# Patient Record
Sex: MALE | Race: WHITE | Employment: FULL TIME
[De-identification: names, ages, dates, MRNs, and addresses within clinical notes are randomized per-mention and may not be internally consistent; named-entity substitution may affect disease eponyms.]

---

## 2018-09-19 ENCOUNTER — TELEPHONE (OUTPATIENT)
Dept: CASE MANAGEMENT | Age: 58
End: 2018-09-19

## 2018-09-19 VITALS — BODY MASS INDEX: 28.93 KG/M2 | WEIGHT: 180 LBS | HEIGHT: 66 IN

## 2018-09-19 NOTE — TELEPHONE ENCOUNTER
Upcoming CT Lung Screening. Encounter opened for documentation of required elements for LCSR.     NIKKI Strange., R.T.(R)(T)  Navigator  [facility]

## 2018-09-26 ENCOUNTER — HOSPITAL ENCOUNTER (OUTPATIENT)
Dept: CT IMAGING | Age: 58
Discharge: HOME | End: 2018-09-28
Payer: COMMERCIAL

## 2018-09-26 DIAGNOSIS — F17.200 CURRENT SMOKER: ICD-10-CM

## 2018-09-26 PROCEDURE — G0297 LDCT FOR LUNG CA SCREEN: HCPCS

## 2018-09-27 ENCOUNTER — TELEPHONE (OUTPATIENT)
Dept: CASE MANAGEMENT | Age: 58
End: 2018-09-27

## 2018-09-27 NOTE — TELEPHONE ENCOUNTER
No call, encounter opened to process CT Lung Screening.       CT Lung Screen 9/26/18 :  Impression   5 mm nodule in the right upper segment of the right lower lobe       Guidelines for Management of Incidental Pulmonary Nodules Detected on   CT Images:  From the Fleischner Society 2017.           Pack years: 40    History   Smoking Status    Current Every Day Smoker    Packs/day: 1.00    Years: 40.00    Types: Cigarettes   Smokeless Tobacco    Not on file     Comment: Per CT Lung Screening Physician Order               NIKKI Nixon., R.T.(R)(T)  Lung Nodule Navigator  [facility] [address] [facility]  [phone]

## 2018-10-15 ENCOUNTER — TELEPHONE (OUTPATIENT)
Dept: CASE MANAGEMENT | Age: 58
End: 2018-10-15

## 2019-03-05 ENCOUNTER — TELEPHONE (OUTPATIENT)
Dept: CASE MANAGEMENT | Age: 59
End: 2019-03-05

## 2019-03-15 ENCOUNTER — TELEPHONE (OUTPATIENT)
Dept: CASE MANAGEMENT | Age: 59
End: 2019-03-15

## 2019-04-12 ENCOUNTER — TELEPHONE (OUTPATIENT)
Dept: CASE MANAGEMENT | Age: 59
End: 2019-04-12

## 2019-04-12 NOTE — TELEPHONE ENCOUNTER
No call, encounter opened to process CT Lung Screening.       CT Lung Screen 9/26/18 :  Impression   5 mm nodule in the right upper segment of the right lower lobe       Guidelines for Management of Incidental Pulmonary Nodules Detected on   CT Images:  From the Fleischner Society 2017.             Pack years: 40               History   Smoking Status    Current Every Day Smoker    Packs/day: 1.00    Years: 40.00    Types: Cigarettes   Smokeless Tobacco    Not on file       Comment: Per CT Lung Screening Physician Order     3/5/2019 - Reminder mailed to patient.        3/15/2019 - Reminder mailed to patient and routed to ordering physician. 4/12/2019 - Follow up call to patient. Patient states he doesn't recall talking to Dr. Marco Antonio Israel about the screening. We discussed that a small  nodule was noted and follow up was suggested in 6 months. He asked that I call Dr. Keily Dalton office to make sure they got the report and agree with the follow up recommendations. 4/12/2019 - Call placed to Dr's office. Dr. Marco Antonio Israel discussed the nodule with patient on 11/5/18 and again at his appointment in March 2019. The office was in the process of sending the referral for the follow up CT Chest already.     NIKKI Santos., R.T.(R)(T)  Lung Nodule Navigator  CT Lung Cancer Screening- [facility]

## 2019-04-26 ENCOUNTER — TELEPHONE (OUTPATIENT)
Dept: CASE MANAGEMENT | Age: 59
End: 2019-04-26

## 2019-04-26 NOTE — TELEPHONE ENCOUNTER
No call, encounter opened to process CT Lung Screening.       CT Lung Screen 9/26/18 :  Impression   5 mm nodule in the right upper segment of the right lower lobe       Guidelines for Management of Incidental Pulmonary Nodules Detected on   CT Images:  From the Fleischner Society 2017.             Pack years: 40               History   Smoking Status    Current Every Day Smoker    Packs/day: 1.00    Years: 40.00    Types: Cigarettes   Smokeless Tobacco    Not on file       Comment: Per CT Lung Screening Physician Order      3/5/2019 - Reminder mailed to patient.        3/15/2019 - Reminder mailed to patient and routed to ordering physician.         4/12/2019 - Follow up call to patient. Patient states he doesn't recall talking to Dr. Ammy Solomon about the screening. We discussed that a small  nodule was noted and follow up was suggested in 6 months. He asked that I call Dr. Guerda Allen office to make sure they got the report and agree with the follow up recommendations.     4/12/2019 - Call placed to Dr's office. Dr. Ammy Solomon discussed the nodule with patient on 11/5/18 and again at his appointment in March 2019. The office was in the process of sending the referral for the follow up CT Chest already. 4/26/2019 - Reminder letter mailed to patient.     NIKKI Cortez., R.T.(R)(T)  Lung Nodule Navigator  CT Lung Cancer Screening- [facility]

## 2019-06-03 ENCOUNTER — TELEPHONE (OUTPATIENT)
Dept: CASE MANAGEMENT | Age: 59
End: 2019-06-03

## 2019-09-06 ENCOUNTER — TELEPHONE (OUTPATIENT)
Dept: CASE MANAGEMENT | Age: 59
End: 2019-09-06

## 2019-09-06 NOTE — TELEPHONE ENCOUNTER
No call, encounter opened to process CT Lung Screening.       CT Lung Screen 9/26/18 :  Impression   5 mm nodule in the right upper segment of the right lower lobe       Guidelines for Management of Incidental Pulmonary Nodules Detected on   CT Images:  From the Fleischner Society 2017.             Pack years: 40               History   Smoking Status    Current Every Day Smoker    Packs/day: 1.00    Years: 40.00    Types: Cigarettes   Smokeless Tobacco    Not on file       Comment: Per CT Lung Screening Physician Order      3/5/2019 - Reminder mailed to patient.        3/15/2019 - Reminder mailed to patient and routed to ordering physician.         4/12/2019 - Follow up call to patient. Patient states he doesn't recall talking to Dr. Woody Leroyr about the screening.  We discussed that a small  nodule was noted and follow up was suggested in 6 months. [facility] asked that I call Dr. Jozef Shoemaker office to make sure they got the report and agree with the follow up recommendations.     4/12/2019 - Call placed to Dr's office.  Dr. Mckay Courser discussed the nodule with patient on 11/5/18 and again at his appointment in March 2019. [facility] office was in the process of sending the referral for the follow up CT Chest already.        4/26/2019 - Reminder letter mailed to patient.     6/3/2019 - Follow up call to Dr. Jozef Shoemaker office, office to call us back and give update as Dr. Hemphill Hew the chart.     7/1/2019 - Office never called back with update. Will close 6 mo fu recommendation and add to annual screening reminder.        9/6/2019 No call, encounter opened for documentation in the lung nodule navigator flow sheet/ Lung screening patient. Annual CT lung screening reminder mailed to patient. Reminders will be sent every 30 days until this patient schedules their screening exam or exceeds 60 days past due. Once a screening is over 60 days past due, the patient will be removed from active program surveillance and future follow up reminders.       CT

## 2019-11-25 ENCOUNTER — TELEPHONE (OUTPATIENT)
Dept: CASE MANAGEMENT | Age: 59
End: 2019-11-25

## 2019-12-27 ENCOUNTER — HOSPITAL ENCOUNTER (OUTPATIENT)
Dept: CT IMAGING | Age: 59
Discharge: HOME | End: 2019-12-29
Payer: COMMERCIAL

## 2019-12-27 DIAGNOSIS — R91.1 COIN LESION: ICD-10-CM

## 2019-12-27 PROCEDURE — 71250 CT THORAX DX C-: CPT

## 2021-10-23 ENCOUNTER — HOSPITAL ENCOUNTER (EMERGENCY)
Age: 61
Discharge: HOME | End: 2021-10-24
Attending: STUDENT IN AN ORGANIZED HEALTH CARE EDUCATION/TRAINING PROGRAM
Payer: COMMERCIAL

## 2021-10-23 ENCOUNTER — APPOINTMENT (OUTPATIENT)
Dept: CT IMAGING | Age: 61
End: 2021-10-23
Payer: COMMERCIAL

## 2021-10-23 ENCOUNTER — APPOINTMENT (OUTPATIENT)
Dept: GENERAL RADIOLOGY | Age: 61
End: 2021-10-23
Payer: COMMERCIAL

## 2021-10-23 DIAGNOSIS — M79.602 LEFT ARM PAIN: Primary | ICD-10-CM

## 2021-10-23 LAB
ALBUMIN SERPL-MCNC: 4.2 G/DL (ref 3.5–5.2)
ALP BLD-CCNC: 93 U/L (ref 40–129)
ALT SERPL-CCNC: 44 U/L (ref 0–40)
ANION GAP SERPL CALCULATED.3IONS-SCNC: 14 MMOL/L (ref 7–16)
AST SERPL-CCNC: 37 U/L (ref 0–39)
BASOPHILS ABSOLUTE: 0.07 E9/L (ref 0–0.2)
BASOPHILS RELATIVE PERCENT: 1 % (ref 0–2)
BILIRUB SERPL-MCNC: 0.4 MG/DL (ref 0–1.2)
BUN BLDV-MCNC: 14 MG/DL (ref 6–23)
CALCIUM SERPL-MCNC: 9.1 MG/DL (ref 8.6–10.2)
CHLORIDE BLD-SCNC: 90 MMOL/L (ref 98–107)
CO2: 27 MMOL/L (ref 22–29)
CREAT SERPL-MCNC: 1 MG/DL (ref 0.7–1.2)
D DIMER: 237 NG/ML DDU
EOSINOPHILS ABSOLUTE: 1.14 E9/L (ref 0.05–0.5)
EOSINOPHILS RELATIVE PERCENT: 16.2 % (ref 0–6)
GFR AFRICAN AMERICAN: >60
GFR NON-AFRICAN AMERICAN: >60 ML/MIN/1.73
GLUCOSE BLD-MCNC: 120 MG/DL (ref 74–99)
HCT VFR BLD CALC: 41.3 % (ref 37–54)
HEMOGLOBIN: 15 G/DL (ref 12.5–16.5)
IMMATURE GRANULOCYTES #: 0.03 E9/L
IMMATURE GRANULOCYTES %: 0.4 % (ref 0–5)
LYMPHOCYTES ABSOLUTE: 1.88 E9/L (ref 1.5–4)
LYMPHOCYTES RELATIVE PERCENT: 26.7 % (ref 20–42)
MCH RBC QN AUTO: 33.4 PG (ref 26–35)
MCHC RBC AUTO-ENTMCNC: 36.3 % (ref 32–34.5)
MCV RBC AUTO: 92 FL (ref 80–99.9)
MONOCYTES ABSOLUTE: 0.99 E9/L (ref 0.1–0.95)
MONOCYTES RELATIVE PERCENT: 14 % (ref 2–12)
NEUTROPHILS ABSOLUTE: 2.94 E9/L (ref 1.8–7.3)
NEUTROPHILS RELATIVE PERCENT: 41.7 % (ref 43–80)
PDW BLD-RTO: 13.6 FL (ref 11.5–15)
PLATELET # BLD: 248 E9/L (ref 130–450)
PMV BLD AUTO: 8.6 FL (ref 7–12)
POTASSIUM REFLEX MAGNESIUM: 3.9 MMOL/L (ref 3.5–5)
RBC # BLD: 4.49 E12/L (ref 3.8–5.8)
SODIUM BLD-SCNC: 131 MMOL/L (ref 132–146)
TOTAL PROTEIN: 6.7 G/DL (ref 6.4–8.3)
TROPONIN, HIGH SENSITIVITY: <6 NG/L (ref 0–11)
WBC # BLD: 7.1 E9/L (ref 4.5–11.5)

## 2021-10-23 PROCEDURE — 93005 ELECTROCARDIOGRAM TRACING: CPT | Performed by: STUDENT IN AN ORGANIZED HEALTH CARE EDUCATION/TRAINING PROGRAM

## 2021-10-23 PROCEDURE — 85378 FIBRIN DEGRADE SEMIQUANT: CPT

## 2021-10-23 PROCEDURE — 84484 ASSAY OF TROPONIN QUANT: CPT

## 2021-10-23 PROCEDURE — 71275 CT ANGIOGRAPHY CHEST: CPT

## 2021-10-23 PROCEDURE — 99284 EMERGENCY DEPT VISIT MOD MDM: CPT

## 2021-10-23 PROCEDURE — 80053 COMPREHEN METABOLIC PANEL: CPT

## 2021-10-23 PROCEDURE — 71045 X-RAY EXAM CHEST 1 VIEW: CPT

## 2021-10-23 PROCEDURE — 85025 COMPLETE CBC W/AUTO DIFF WBC: CPT

## 2021-10-23 PROCEDURE — 36415 COLL VENOUS BLD VENIPUNCTURE: CPT

## 2021-10-23 PROCEDURE — 6370000000 HC RX 637 (ALT 250 FOR IP): Performed by: STUDENT IN AN ORGANIZED HEALTH CARE EDUCATION/TRAINING PROGRAM

## 2021-10-23 RX ORDER — ACETAMINOPHEN 325 MG/1
650 TABLET ORAL ONCE
Status: COMPLETED | OUTPATIENT
Start: 2021-10-23 | End: 2021-10-23

## 2021-10-23 RX ADMIN — ACETAMINOPHEN 650 MG: 325 TABLET ORAL at 22:32

## 2021-10-23 ASSESSMENT — PAIN SCALES - GENERAL
PAINLEVEL_OUTOF10: 5
PAINLEVEL_OUTOF10: 7

## 2021-10-23 ASSESSMENT — PAIN DESCRIPTION - LOCATION: LOCATION: ARM

## 2021-10-23 ASSESSMENT — PAIN DESCRIPTION - DESCRIPTORS: DESCRIPTORS: ACHING;TINGLING

## 2021-10-23 ASSESSMENT — PAIN DESCRIPTION - PAIN TYPE: TYPE: ACUTE PAIN

## 2021-10-23 ASSESSMENT — PAIN DESCRIPTION - ORIENTATION: ORIENTATION: LEFT

## 2021-10-24 VITALS
RESPIRATION RATE: 15 BRPM | HEART RATE: 70 BPM | HEIGHT: 67 IN | WEIGHT: 185 LBS | OXYGEN SATURATION: 94 % | TEMPERATURE: 97.5 F | BODY MASS INDEX: 29.03 KG/M2 | SYSTOLIC BLOOD PRESSURE: 128 MMHG | DIASTOLIC BLOOD PRESSURE: 78 MMHG

## 2021-10-24 LAB
EKG ATRIAL RATE: 59 BPM
EKG P AXIS: 27 DEGREES
EKG P-R INTERVAL: 178 MS
EKG Q-T INTERVAL: 468 MS
EKG QRS DURATION: 136 MS
EKG QTC CALCULATION (BAZETT): 463 MS
EKG R AXIS: 71 DEGREES
EKG T AXIS: 86 DEGREES
EKG VENTRICULAR RATE: 59 BPM

## 2021-10-24 PROCEDURE — 93010 ELECTROCARDIOGRAM REPORT: CPT | Performed by: INTERNAL MEDICINE

## 2021-10-24 PROCEDURE — 6360000004 HC RX CONTRAST MEDICATION: Performed by: RADIOLOGY

## 2021-10-24 RX ADMIN — IOPAMIDOL 75 ML: 755 INJECTION, SOLUTION INTRAVENOUS at 00:23

## 2021-10-24 ASSESSMENT — PAIN DESCRIPTION - DESCRIPTORS: DESCRIPTORS: ACHING

## 2021-10-24 ASSESSMENT — PAIN DESCRIPTION - ORIENTATION: ORIENTATION: LEFT

## 2021-10-24 ASSESSMENT — PAIN DESCRIPTION - FREQUENCY: FREQUENCY: CONTINUOUS

## 2021-10-24 ASSESSMENT — PAIN SCALES - GENERAL: PAINLEVEL_OUTOF10: 2

## 2021-10-24 ASSESSMENT — PAIN DESCRIPTION - PROGRESSION: CLINICAL_PROGRESSION: GRADUALLY IMPROVING

## 2021-10-24 ASSESSMENT — PAIN DESCRIPTION - ONSET: ONSET: ON-GOING

## 2021-10-24 ASSESSMENT — PAIN DESCRIPTION - LOCATION: LOCATION: SHOULDER

## 2021-10-24 ASSESSMENT — PAIN DESCRIPTION - PAIN TYPE: TYPE: ACUTE PAIN

## 2021-10-24 NOTE — ED PROVIDER NOTES
Department of Emergency Medicine   ED  Provider Note  Admit Date/RoomTime: 10/23/2021  9:03 PM  ED Room: 13/13          History of Present Illness:  10/23/21, Time: 10:33 PM EDT  Chief Complaint   Patient presents with    Arm Pain     Pt awoke with left arm pain and tingling in left hand. Governor Solitario is a 56 y.o. male presenting to the ED for left arm pain. The patient states when he woke up this morning he developed left arm pain. Is been constant and sore. Seems to worsen with movement. Nothing seems to improve it. He does have some tingling of the left hand as well. His daughter told him this could be heart attacks he wanted to be evaluated. The patient denies any chest pain, nausea, vomiting, fevers, abdominal pain, or neck pain. Denies any weakness. He does have a chronic cough that is nonproductive. He also is chronically short of breath and feels this is unchanged from baseline. He is a smoker. Denies history of blood clots, hormone use or leg swelling. The patient did travel to [place] recently in September. Review of Systems:   Constitutional symptoms: Denies fever  Eyes: Denies eye pain  Ears, Nose, Mouth, Throat: Denies ear pain  Cardiovascular: Denies chest pain  Respiratory: Positive for chronic shortness of breath and cough  Gastrointestinal: Denies blood per rectum  Genitourinary: Denies hematuria  Skin: Denies rashes  Neurological: Denies headache. Positive for left arm paresthesias  Musculoskeletal: Positive for left arm pain    --------------------------------------------- PAST HISTORY ---------------------------------------------  Past Medical History:  has a past medical history of Herniated cervical disc, Hypertension, and Psoriasis. Past Surgical History:  has a past surgical history that includes back surgery (1990s). Social History:  reports that he has been smoking cigarettes. He has a 40.00 pack-year smoking history.  He has never used smokeless tobacco. He reports current alcohol use. He reports that he does not use drugs. Family History: family history is not on file. . Unless otherwise noted, family history is non contributory    The patients home medications have been reviewed. Allergies: Patient has no known allergies. I have reviewed the past medical history, past surgical history, social history, and family history    ---------------------------------------------------PHYSICAL EXAM--------------------------------------    General: The patient is conversational and lying comfortably in bed. Head: Normocephalic and atraumatic. Eyes: Sclera non-icteric and no conjunctival injection  ENT: Nasal and oral membranes moist  Neck: Trachea midline. No JVD  Respiratory: Lungs clear to auscultation bilaterally. Patient speaking in full sentences. Cardiovascular: Regular rate. No pedal edema. 2+ radial pulses  Gastrointestinal:  Abdomen is soft and nondistended. Bowel sounds present. There is no tenderness. There is no guarding or rebound. Musculoskeletal: No deformity. No tenderness of the midline spine. No step-offs or deformities. No meningismus. No bony tenderness of the left upper extremity. Patient able to give thumbs up, cross his fingers and okay sign. Skin: Skin warm and dry. No rashes. Neurologic: No gross motor deficits. No aphasia. Sensation intact to light touch  Psychiatric: Normal affect.    -------------------------------------------------- RESULTS -------------------------------------------------  I have personally reviewed all laboratory and imaging results for this patient. Results are listed below.      LABS: (Lab results interpreted by me)  Results for orders placed or performed during the hospital encounter of 10/23/21   CBC Auto Differential   Result Value Ref Range    WBC 7.1 4.5 - 11.5 E9/L    RBC 4.49 3.80 - 5.80 E12/L    Hemoglobin 15.0 12.5 - 16.5 g/dL    Hematocrit 41.3 37.0 - 54.0 %    MCV 92.0 80.0 - 99.9 fL    MCH 33.4 26.0 - 35.0 pg    MCHC 36.3 (H) 32.0 - 34.5 %    RDW 13.6 11.5 - 15.0 fL    Platelets 658 985 - 785 E9/L    MPV 8.6 7.0 - 12.0 fL    Neutrophils % 41.7 (L) 43.0 - 80.0 %    Immature Granulocytes % 0.4 0.0 - 5.0 %    Lymphocytes % 26.7 20.0 - 42.0 %    Monocytes % 14.0 (H) 2.0 - 12.0 %    Eosinophils % 16.2 (H) 0.0 - 6.0 %    Basophils % 1.0 0.0 - 2.0 %    Neutrophils Absolute 2.94 1.80 - 7.30 E9/L    Immature Granulocytes # 0.03 E9/L    Lymphocytes Absolute 1.88 1.50 - 4.00 E9/L    Monocytes Absolute 0.99 (H) 0.10 - 0.95 E9/L    Eosinophils Absolute 1.14 (H) 0.05 - 0.50 E9/L    Basophils Absolute 0.07 0.00 - 0.20 E9/L   Comprehensive Metabolic Panel w/ Reflex to MG   Result Value Ref Range    Sodium 131 (L) 132 - 146 mmol/L    Potassium reflex Magnesium 3.9 3.5 - 5.0 mmol/L    Chloride 90 (L) 98 - 107 mmol/L    CO2 27 22 - 29 mmol/L    Anion Gap 14 7 - 16 mmol/L    Glucose 120 (H) 74 - 99 mg/dL    BUN 14 6 - 23 mg/dL    CREATININE 1.0 0.7 - 1.2 mg/dL    GFR Non-African American >60 >=60 mL/min/1.73    GFR African American >60     Calcium 9.1 8.6 - 10.2 mg/dL    Total Protein 6.7 6.4 - 8.3 g/dL    Albumin 4.2 3.5 - 5.2 g/dL    Total Bilirubin 0.4 0.0 - 1.2 mg/dL    Alkaline Phosphatase 93 40 - 129 U/L    ALT 44 (H) 0 - 40 U/L    AST 37 0 - 39 U/L   Troponin   Result Value Ref Range    Troponin, High Sensitivity <6 0 - 11 ng/L   D-Dimer, Quantitative   Result Value Ref Range    D-Dimer, Quant 237 ng/mL DDU   EKG 12 Lead   Result Value Ref Range    Ventricular Rate 59 BPM    Atrial Rate 59 BPM    P-R Interval 178 ms    QRS Duration 136 ms    Q-T Interval 468 ms    QTc Calculation (Bazett) 463 ms    P Axis 27 degrees    R Axis 71 degrees    T Axis 86 degrees   ,     RADIOLOGY:  Interpreted by Radiologist unless otherwise specified  CTA PULMONARY W CONTRAST   Final Result   No acute finding in the chest with no evidence of pulmonary embolism. Marked diffuse hepatic steatosis.          XR CHEST PORTABLE   Final Result   No acute abnormality identified.             ------------------------- NURSING NOTES AND VITALS REVIEWED ---------------------------   The nursing notes within the ED encounter and vital signs as below have been reviewed by myself  /78   Pulse 70   Temp 97.5 °F (36.4 °C) (Oral)   Resp 15   Ht 5' 7\" (1.702 m)   Wt 185 lb (83.9 kg)   SpO2 94%   BMI 28.98 kg/m²     Oxygen Saturation Interpretation: Normal    The patients available past medical records and past encounters were reviewed. ------------------------------ ED COURSE/MEDICAL DECISION MAKING----------------------  Medications   acetaminophen (TYLENOL) tablet 650 mg (650 mg Oral Given 10/23/21 2232)   iopamidol (ISOVUE-370) 76 % injection 75 mL (75 mLs IntraVENous Given 10/24/21 0023)       Medical Decision Making: This is a 56 y.o. male presenting to the ED for left arm pain. On initial presentation, the patient's vital signs are interpreted as hypertensive, afebrile and minimally hypoxic. Based on history and physical exam, we have a broad differential.  We are concerned for ACS, arrhythmia, musculoskeletal strain, and less likely pneumonia. Because of the patient's hypoxia I did consider PE with his recent travel. We have no baseline in the system to compare this to. He does not currently feel more short of breath than baseline. His lungs are clear he has no evidence of COPD exacerbation although he is a chronic smoker. At this time we will obtain chest x-ray, EKG and laboratory studies. A 12-lead EKG was performed and interpreted by myself. The rate is 59 with sinus bradycardia and normal axis. The patient has a right bundle branch block. The CT interval is 178, QRS interval is 136, and QTC interval is 463. The patient has biphasic T waves in V1 and V2. No acute ST elevation or depression. This is sinus bradycardia with nonspecific abnormality and right bundle branch block. No prior to compare to.     Chest x-ray shows no acute cardiopulmonary process. This is interpreted by radiology. Laboratory studies show mild hyponatremia. Electrolytes otherwise within normal limits. Troponin is negative. ALT mildly elevated. No leukocytosis or anemia. D-dimer positive. At this time, my shift has ended. The patient is pending CT PE. He will be signed out to the oncoming physician, Dr. Jewel Vines. This patient's ED course included: a personal history and physicial examination    This patient has remained hemodynamically stable during their ED course. Consultations:  None    The cardiac monitor revealed sinus rhythm with a heart rate in the 70s as interpreted by me. The cardiac monitor was ordered secondary to the patient's chest pain and to monitor the patient for dysrhythmia. CPT [id number]    Oxygen Saturation Interpretation: 94 % on room air. Abnormal    Counseling:   I have spoken with the patient and discussed todays results, in addition to providing specific details for the plan of care and counseling regarding the diagnosis and prognosis. Questions are answered at this time and they are agreeable with the plan.     --------------------------------- IMPRESSION AND DISPOSITION ---------------------------------    IMPRESSION  1. Left arm pain        DISPOSITION  Disposition: Pending work up  Patient condition is fair    I, Dr. Shine Ornelas, am the primary provider of record    NOTE: This report was transcribed using voice recognition software.  Every effort was made to ensure accuracy; however, inadvertent computerized transcription errors may be present        Shine Ornelas MD  10/24/21 8323

## 2021-10-30 NOTE — ED NOTES
Received patient in signout from Dr. Geovanna Rosado. Patient presented with arm pain in the left arm and tingling. Suspicion of PE was noted. CTA was pending upon Dr. Geovanna Rosado leaving emergency department. CTA finished and no PE found on CT imaging. Patient was reassured regarding this finding. Stable emerged part reevaluation was reassuring and patient discharged with follow-up to PCP. Patient was explicitly instructed on specific signs and symptoms on which to return to the emergency room for. Patient was instructed to return to the ER for any new or worsening symptoms. Additional discharge instructions were given verbally. All questions were answered. Patient is comfortable and agreeable with discharge plan. Patient in no acute distress and non-toxic in appearance.        Electronically signed by Mamadou Lovell DO on 10/30/2021 at 2:13 AM       Mamadou Lovell DO  Resident  10/30/21 1937

## 2024-01-11 ENCOUNTER — HOSPITAL ENCOUNTER (OUTPATIENT)
Dept: CT IMAGING | Age: 64
Discharge: HOME | End: 2024-01-13
Attending: FAMILY MEDICINE
Payer: COMMERCIAL

## 2024-01-11 DIAGNOSIS — Z87.891 PERSONAL HISTORY OF TOBACCO USE, PRESENTING HAZARDS TO HEALTH: ICD-10-CM

## 2024-01-11 DIAGNOSIS — F17.210 CIGARETTE SMOKER: ICD-10-CM

## 2024-01-11 PROCEDURE — 71271 CT THORAX LUNG CANCER SCR C-: CPT

## 2024-01-15 ENCOUNTER — TELEPHONE (OUTPATIENT)
Dept: CASE MANAGEMENT | Age: 64
End: 2024-01-15

## 2024-01-15 NOTE — TELEPHONE ENCOUNTER
No call, encounter opened to process CT Lung Screening.    CT Lung Screen: 1/11/2024    IMPRESSION:  1. There is no pulmonary infiltrate, mass or suspicious pulmonary nodule  2.  LUNG RADS:  Lung-RADS 1 - Negative (v2022)     Management:  12 month screening LDCT     RECOMMENDATIONS:  If you would like to register your patient with the [facility] Lung Nodule/Lung  Cancer Screening Program, please contact the Nurse Navigator at  [phone].    Pack years: 40    Social History     Tobacco Use  Smoking Status: Current Every Day Smoker    Start Date:    Quit Date:    Types: Cigarettes   Packs/Day: 1   Years: 40   Pack Years: 40   Smokeless Tobacco: Never         Results letter sent to patient via my chart or mailed.     Osiel Ruano  Imaging Navigator   [facility]   [phone]

## 2024-01-29 VITALS
TEMPERATURE: 98.3 F | SYSTOLIC BLOOD PRESSURE: 144 MMHG | RESPIRATION RATE: 18 BRPM | HEART RATE: 63 BPM | BODY MASS INDEX: 30.76 KG/M2 | HEIGHT: 67 IN | OXYGEN SATURATION: 96 % | DIASTOLIC BLOOD PRESSURE: 74 MMHG | WEIGHT: 196 LBS

## 2024-02-26 ENCOUNTER — OFFICE VISIT (OUTPATIENT)
Age: 64
End: 2024-02-26
Payer: COMMERCIAL

## 2024-02-26 VITALS
HEIGHT: 67 IN | HEART RATE: 69 BPM | WEIGHT: 200 LBS | RESPIRATION RATE: 18 BRPM | OXYGEN SATURATION: 97 % | DIASTOLIC BLOOD PRESSURE: 70 MMHG | TEMPERATURE: 98.6 F | BODY MASS INDEX: 31.39 KG/M2 | SYSTOLIC BLOOD PRESSURE: 132 MMHG

## 2024-02-26 DIAGNOSIS — I10 ESSENTIAL HYPERTENSION, BENIGN: ICD-10-CM

## 2024-02-26 DIAGNOSIS — E78.2 MIXED HYPERLIPIDEMIA: Primary | ICD-10-CM

## 2024-02-26 DIAGNOSIS — E87.1 HYPONATREMIA: ICD-10-CM

## 2024-02-26 DIAGNOSIS — Z12.5 SCREENING FOR PROSTATE CANCER: ICD-10-CM

## 2024-02-26 DIAGNOSIS — Z79.899 ENCOUNTER FOR LONG-TERM (CURRENT) USE OF MEDICATIONS: ICD-10-CM

## 2024-02-26 DIAGNOSIS — R53.83 FATIGUE, UNSPECIFIED TYPE: ICD-10-CM

## 2024-02-26 PROCEDURE — 99397 PER PM REEVAL EST PAT 65+ YR: CPT | Performed by: FAMILY MEDICINE

## 2024-02-26 PROCEDURE — 3075F SYST BP GE 130 - 139MM HG: CPT | Performed by: FAMILY MEDICINE

## 2024-02-26 PROCEDURE — 3078F DIAST BP <80 MM HG: CPT | Performed by: FAMILY MEDICINE

## 2024-02-26 RX ORDER — LISINOPRIL AND HYDROCHLOROTHIAZIDE 20; 12.5 MG/1; MG/1
1 TABLET ORAL DAILY
COMMUNITY
Start: 2023-11-30

## 2024-02-26 RX ORDER — AMLODIPINE BESYLATE 10 MG/1
10 TABLET ORAL DAILY
COMMUNITY

## 2024-02-26 SDOH — ECONOMIC STABILITY: FOOD INSECURITY: WITHIN THE PAST 12 MONTHS, THE FOOD YOU BOUGHT JUST DIDN'T LAST AND YOU DIDN'T HAVE MONEY TO GET MORE.: NEVER TRUE

## 2024-02-26 SDOH — ECONOMIC STABILITY: HOUSING INSECURITY
IN THE LAST 12 MONTHS, WAS THERE A TIME WHEN YOU DID NOT HAVE A STEADY PLACE TO SLEEP OR SLEPT IN A SHELTER (INCLUDING NOW)?: NO

## 2024-02-26 SDOH — ECONOMIC STABILITY: FOOD INSECURITY: WITHIN THE PAST 12 MONTHS, YOU WORRIED THAT YOUR FOOD WOULD RUN OUT BEFORE YOU GOT MONEY TO BUY MORE.: NEVER TRUE

## 2024-02-26 ASSESSMENT — PATIENT HEALTH QUESTIONNAIRE - PHQ9
2. FEELING DOWN, DEPRESSED OR HOPELESS: 0
SUM OF ALL RESPONSES TO PHQ QUESTIONS 1-9: 0
SUM OF ALL RESPONSES TO PHQ9 QUESTIONS 1 & 2: 0
1. LITTLE INTEREST OR PLEASURE IN DOING THINGS: 0
SUM OF ALL RESPONSES TO PHQ QUESTIONS 1-9: 0

## 2024-02-27 PROBLEM — Z79.899 ENCOUNTER FOR LONG-TERM (CURRENT) USE OF MEDICATIONS: Status: ACTIVE | Noted: 2024-02-27

## 2024-02-27 PROBLEM — F17.200 TOBACCO USE DISORDER: Status: ACTIVE | Noted: 2024-02-27

## 2024-02-27 PROBLEM — Z12.5 SCREENING FOR PROSTATE CANCER: Status: ACTIVE | Noted: 2024-02-27

## 2024-02-27 PROBLEM — I10 ESSENTIAL HYPERTENSION, BENIGN: Status: ACTIVE | Noted: 2024-02-27

## 2024-02-27 PROBLEM — R53.83 FATIGUE: Status: ACTIVE | Noted: 2024-02-27

## 2024-02-27 PROBLEM — E87.1 HYPONATREMIA: Status: ACTIVE | Noted: 2024-02-27

## 2024-02-27 PROBLEM — E78.2 MIXED HYPERLIPIDEMIA: Status: ACTIVE | Noted: 2024-02-27

## 2024-02-27 ASSESSMENT — ENCOUNTER SYMPTOMS
RESPIRATORY NEGATIVE: 1
GASTROINTESTINAL NEGATIVE: 1

## 2024-02-27 NOTE — PROGRESS NOTES
Manny Schaffer (:  1960) is a 63 y.o. male,Established patient, here for evaluation of the following chief complaint(s):  6 Month Follow-Up         ASSESSMENT/PLAN:  1. Mixed hyperlipidemia  -     Lipid Panel; Future  2. Essential hypertension, benign  -     Comprehensive Metabolic Panel; Future  3. Encounter for long-term (current) use of medications  -     Comprehensive Metabolic Panel; Future  4. Screening for prostate cancer  -     PSA Screening; Future  5. Fatigue, unspecified type  -     CBC with Auto Differential; Future  -     TSH; Future  6. Hyponatremia  -     Comprehensive Metabolic Panel; Future      Return in about 6 months (around 2024) for Next appointment in [facility].         Subjective   SUBJECTIVE/OBJECTIVE:  HPI  63-year-old with hypertension, psoriasis and tobacco use comes in for 6-month fasting checkup.  Continues to smoke.  Recent CT of the chest was negative for malignancy and will be done again in 1 year.  Recent colonoscopy was normal and will be due in 10 years.  Patient also underwent screening for abdominal aortic aneurysm which was negative.    Review of Systems   Constitutional: Negative.  Negative for activity change and appetite change.   HENT: Negative.     Respiratory: Negative.     Cardiovascular: Negative.  Negative for chest pain.   Gastrointestinal: Negative.    Genitourinary: Negative.    Musculoskeletal: Negative.    Neurological: Negative.    Psychiatric/Behavioral: Negative.            Objective /70   Pulse 69   Temp 98.6 °F (37 °C)   Resp 18   Ht 1.702 m (5' 7\")   Wt 90.7 kg (200 lb)   SpO2 97%   BMI 31.32 kg/m²    Physical Exam  Constitutional:       General: He is not in acute distress.     Appearance: Normal appearance. He is not toxic-appearing.   HENT:      Head: Normocephalic and atraumatic.      Right Ear: Tympanic membrane, ear canal and external ear normal.      Left Ear: Tympanic membrane, ear canal and external ear normal.

## 2024-03-08 ENCOUNTER — HOSPITAL ENCOUNTER (OUTPATIENT)
Age: 64
Discharge: HOME | End: 2024-03-08
Payer: COMMERCIAL

## 2024-03-08 DIAGNOSIS — E78.2 MIXED HYPERLIPIDEMIA: ICD-10-CM

## 2024-03-08 DIAGNOSIS — I10 ESSENTIAL HYPERTENSION, BENIGN: ICD-10-CM

## 2024-03-08 DIAGNOSIS — E87.1 HYPONATREMIA: ICD-10-CM

## 2024-03-08 DIAGNOSIS — R53.83 FATIGUE, UNSPECIFIED TYPE: ICD-10-CM

## 2024-03-08 DIAGNOSIS — Z79.899 ENCOUNTER FOR LONG-TERM (CURRENT) USE OF MEDICATIONS: ICD-10-CM

## 2024-03-08 DIAGNOSIS — Z12.5 SCREENING FOR PROSTATE CANCER: ICD-10-CM

## 2024-03-08 LAB
ALBUMIN SERPL-MCNC: 4.3 G/DL (ref 3.5–5.2)
ALP SERPL-CCNC: 89 U/L (ref 40–129)
ALT SERPL-CCNC: 81 U/L (ref 0–40)
ANION GAP SERPL CALCULATED.3IONS-SCNC: 9 MMOL/L (ref 7–16)
AST SERPL-CCNC: 60 U/L (ref 0–39)
BASOPHILS # BLD: 0.05 K/UL (ref 0–0.2)
BASOPHILS NFR BLD: 1 % (ref 0–2)
BILIRUB SERPL-MCNC: 0.6 MG/DL (ref 0–1.2)
BUN SERPL-MCNC: 10 MG/DL (ref 6–23)
CALCIUM SERPL-MCNC: 9.7 MG/DL (ref 8.6–10.2)
CHLORIDE SERPL-SCNC: 92 MMOL/L (ref 98–107)
CHOLEST SERPL-MCNC: 169 MG/DL
CO2 SERPL-SCNC: 28 MMOL/L (ref 22–29)
CREAT SERPL-MCNC: 0.8 MG/DL (ref 0.7–1.2)
EOSINOPHIL # BLD: 1.21 K/UL (ref 0.05–0.5)
EOSINOPHILS RELATIVE PERCENT: 19 % (ref 0–6)
ERYTHROCYTE [DISTWIDTH] IN BLOOD BY AUTOMATED COUNT: 13.8 % (ref 11.5–15)
GFR SERPL CREATININE-BSD FRML MDRD: >60 ML/MIN/1.73M2
GLUCOSE SERPL-MCNC: 117 MG/DL (ref 74–99)
HCT VFR BLD AUTO: 44.3 % (ref 37–54)
HDLC SERPL-MCNC: 44 MG/DL
HGB BLD-MCNC: 16 G/DL (ref 12.5–16.5)
IMM GRANULOCYTES # BLD AUTO: <0.03 K/UL (ref 0–0.58)
IMM GRANULOCYTES NFR BLD: 0 % (ref 0–5)
LDLC SERPL CALC-MCNC: 111 MG/DL
LYMPHOCYTES NFR BLD: 1.53 K/UL (ref 1.5–4)
LYMPHOCYTES RELATIVE PERCENT: 24 % (ref 20–42)
MCH RBC QN AUTO: 32.8 PG (ref 26–35)
MCHC RBC AUTO-ENTMCNC: 36.1 G/DL (ref 32–34.5)
MCV RBC AUTO: 90.8 FL (ref 80–99.9)
MONOCYTES NFR BLD: 0.85 K/UL (ref 0.1–0.95)
MONOCYTES NFR BLD: 14 % (ref 2–12)
NEUTROPHILS NFR BLD: 42 % (ref 43–80)
NEUTS SEG NFR BLD: 2.64 K/UL (ref 1.8–7.3)
PLATELET # BLD AUTO: 291 K/UL (ref 130–450)
PMV BLD AUTO: 8.5 FL (ref 7–12)
POTASSIUM SERPL-SCNC: 4.6 MMOL/L (ref 3.5–5)
PROT SERPL-MCNC: 7.6 G/DL (ref 6.4–8.3)
PSA SERPL-MCNC: 0.21 NG/ML (ref 0–4)
RBC # BLD AUTO: 4.88 M/UL (ref 3.8–5.8)
SODIUM SERPL-SCNC: 129 MMOL/L (ref 132–146)
TRIGL SERPL-MCNC: 71 MG/DL
TSH SERPL DL<=0.05 MIU/L-ACNC: 1.36 UIU/ML (ref 0.27–4.2)
VLDLC SERPL CALC-MCNC: 14 MG/DL
WBC OTHER # BLD: 6.3 K/UL (ref 4.5–11.5)

## 2024-03-08 PROCEDURE — 84443 ASSAY THYROID STIM HORMONE: CPT

## 2024-03-08 PROCEDURE — G0103 PSA SCREENING: HCPCS

## 2024-03-08 PROCEDURE — 85025 COMPLETE CBC W/AUTO DIFF WBC: CPT

## 2024-03-08 PROCEDURE — 80061 LIPID PANEL: CPT

## 2024-03-08 PROCEDURE — 80053 COMPREHEN METABOLIC PANEL: CPT

## 2024-03-08 PROCEDURE — 36415 COLL VENOUS BLD VENIPUNCTURE: CPT

## 2024-03-28 PROBLEM — Z12.5 SCREENING FOR PROSTATE CANCER: Status: RESOLVED | Noted: 2024-02-27 | Resolved: 2024-03-28

## 2024-09-06 ENCOUNTER — OFFICE VISIT (OUTPATIENT)
Age: 64
End: 2024-09-06
Payer: COMMERCIAL

## 2024-09-06 VITALS
WEIGHT: 191.6 LBS | RESPIRATION RATE: 18 BRPM | DIASTOLIC BLOOD PRESSURE: 74 MMHG | HEIGHT: 66 IN | HEART RATE: 64 BPM | SYSTOLIC BLOOD PRESSURE: 138 MMHG | TEMPERATURE: 98.2 F | BODY MASS INDEX: 30.79 KG/M2 | OXYGEN SATURATION: 96 %

## 2024-09-06 DIAGNOSIS — I10 ESSENTIAL HYPERTENSION, BENIGN: Primary | ICD-10-CM

## 2024-09-06 DIAGNOSIS — Z72.0 TOBACCO USE: ICD-10-CM

## 2024-09-06 DIAGNOSIS — N63.21 MASS OF UPPER OUTER QUADRANT OF LEFT BREAST: ICD-10-CM

## 2024-09-06 DIAGNOSIS — Z79.899 ENCOUNTER FOR LONG-TERM (CURRENT) USE OF MEDICATIONS: ICD-10-CM

## 2024-09-06 PROCEDURE — 1123F ACP DISCUSS/DSCN MKR DOCD: CPT | Performed by: FAMILY MEDICINE

## 2024-09-06 PROCEDURE — 99214 OFFICE O/P EST MOD 30 MIN: CPT | Performed by: FAMILY MEDICINE

## 2024-09-06 PROCEDURE — 3075F SYST BP GE 130 - 139MM HG: CPT | Performed by: FAMILY MEDICINE

## 2024-09-06 PROCEDURE — 3078F DIAST BP <80 MM HG: CPT | Performed by: FAMILY MEDICINE

## 2024-09-06 RX ORDER — LISINOPRIL AND HYDROCHLOROTHIAZIDE 12.5; 2 MG/1; MG/1
1 TABLET ORAL DAILY
Qty: 90 TABLET | Refills: 3 | Status: SHIPPED | OUTPATIENT
Start: 2024-09-06

## 2024-09-06 RX ORDER — AMLODIPINE BESYLATE 10 MG/1
10 TABLET ORAL DAILY
Qty: 90 TABLET | Refills: 3 | Status: SHIPPED | OUTPATIENT
Start: 2024-09-06

## 2024-09-06 ASSESSMENT — ENCOUNTER SYMPTOMS
GASTROINTESTINAL NEGATIVE: 1
RESPIRATORY NEGATIVE: 1

## 2024-09-06 NOTE — ASSESSMENT & PLAN NOTE
Chronic, at goal (stable), continue current treatment plan    Orders:    Comprehensive Metabolic Panel; Future

## 2025-03-28 ENCOUNTER — OFFICE VISIT (OUTPATIENT)
Age: 65
End: 2025-03-28

## 2025-03-28 VITALS
OXYGEN SATURATION: 93 % | DIASTOLIC BLOOD PRESSURE: 60 MMHG | SYSTOLIC BLOOD PRESSURE: 120 MMHG | WEIGHT: 200.4 LBS | RESPIRATION RATE: 18 BRPM | HEIGHT: 67 IN | HEART RATE: 91 BPM | TEMPERATURE: 98.2 F | BODY MASS INDEX: 31.45 KG/M2

## 2025-03-28 DIAGNOSIS — Z12.5 SCREENING FOR PROSTATE CANCER: ICD-10-CM

## 2025-03-28 DIAGNOSIS — Z79.899 ENCOUNTER FOR LONG-TERM (CURRENT) USE OF MEDICATIONS: ICD-10-CM

## 2025-03-28 DIAGNOSIS — Z00.00 WELL ADULT EXAM: Primary | ICD-10-CM

## 2025-03-28 DIAGNOSIS — I10 ESSENTIAL HYPERTENSION, BENIGN: ICD-10-CM

## 2025-03-28 DIAGNOSIS — E87.1 HYPONATREMIA: ICD-10-CM

## 2025-03-28 DIAGNOSIS — R53.83 FATIGUE, UNSPECIFIED TYPE: ICD-10-CM

## 2025-03-28 DIAGNOSIS — E78.2 MIXED HYPERLIPIDEMIA: ICD-10-CM

## 2025-03-28 DIAGNOSIS — Z87.891 PERSONAL HISTORY OF TOBACCO USE: ICD-10-CM

## 2025-03-28 DIAGNOSIS — N63.21 MASS OF UPPER OUTER QUADRANT OF LEFT BREAST: ICD-10-CM

## 2025-03-28 LAB
BILIRUBIN, POC: NORMAL
BLOOD URINE, POC: NORMAL
CLARITY, POC: CLEAR
COLOR, POC: NORMAL
GLUCOSE URINE, POC: NORMAL MG/DL
KETONES, POC: NORMAL MG/DL
LEUKOCYTE EST, POC: NORMAL
NITRITE, POC: NORMAL
PH, POC: 6
PROTEIN, POC: NORMAL MG/DL
SPECIFIC GRAVITY, POC: 1.01
UROBILINOGEN, POC: 0.2 MG/DL

## 2025-03-28 RX ORDER — MOXIFLOXACIN 5 MG/ML
1 SOLUTION/ DROPS OPHTHALMIC 3 TIMES DAILY
Qty: 1 EACH | Refills: 0 | Status: SHIPPED | OUTPATIENT
Start: 2025-03-28 | End: 2025-04-04

## 2025-03-28 SDOH — ECONOMIC STABILITY: FOOD INSECURITY: WITHIN THE PAST 12 MONTHS, THE FOOD YOU BOUGHT JUST DIDN'T LAST AND YOU DIDN'T HAVE MONEY TO GET MORE.: NEVER TRUE

## 2025-03-28 SDOH — ECONOMIC STABILITY: FOOD INSECURITY: WITHIN THE PAST 12 MONTHS, YOU WORRIED THAT YOUR FOOD WOULD RUN OUT BEFORE YOU GOT MONEY TO BUY MORE.: NEVER TRUE

## 2025-03-28 ASSESSMENT — PATIENT HEALTH QUESTIONNAIRE - PHQ9
SUM OF ALL RESPONSES TO PHQ QUESTIONS 1-9: 0
1. LITTLE INTEREST OR PLEASURE IN DOING THINGS: NOT AT ALL
2. FEELING DOWN, DEPRESSED OR HOPELESS: NOT AT ALL

## 2025-03-28 NOTE — PROGRESS NOTES
Name: Manny Schaffer Today’s Date: 3/30/2025   MRN: [id number] Sex: Male   Age: 64 y.o. Ethnicity: Non- / Non    : 1960 Race: White (non-)      Manny cShaffer is here for a well adult exam.       Assessment & Plan   Well adult exam  -     POCT Urinalysis no Micro  Essential hypertension, benign  -     Comprehensive Metabolic Panel; Future  Mixed hyperlipidemia  -     Lipid Panel; Future  Hyponatremia  -     Comprehensive Metabolic Panel; Future  Mass of upper outer quadrant of left breast once again ordered ultrasound last visit but patient did not go to the appointment we are scheduling his yearly CT of the chest should show the left chest wall we will wait the results once again looks like a lipoma  Screening for prostate cancer  -     PSA Screening; Future  Fatigue, unspecified type  -     CBC with Auto Differential; Future  -     TSH; Future  Encounter for long-term (current) use of medications  -     Comprehensive Metabolic Panel; Future  Personal history of tobacco use  -     OH VISIT TO DISCUSS LUNG CA SCREEN W LDCT  -     OH VISIT TO DISCUSS LUNG CA SCREEN W LDCT  -     CT Lung Screen (Initial/Annual/Baseline); Future        Return in about 6 months (around 2025) for 1 year physical.       Subjective   History:  64-year-old comes in for his fasting physical.  States he been feeling well treated for hypertension.  Hyperlipidemia treated with diet.  He continues to smoke he is now due for screening CT of the chest.  Also had a left chest mass felt like a lipoma last visit we ordered a ultrasound however patient never went.  He is agreeable for his screening CT of the chest however this should show the area in question once again feels like a lipoma.  Also follows with dermatology with psoriasis.  Mild hyponatremia secondary to his diuretic this will be checked also.  Colonoscopy in 2024 was normal he will be due again in 9 years.  Also some irritation of his left eye

## 2025-03-28 NOTE — PATIENT INSTRUCTIONS
recommend medicines or other ways to help.  Follow-up care is a key part of your treatment and safety. Be sure to make and go to all appointments, and call your doctor if you are having problems. It's also a good idea to know your test results and keep a list of the medicines you take.  Where can you learn more?  Go to https://www.[employer].net/patientEd and enter Q940 to learn more about \"Learning About Lung Cancer Screening.\"  Current as of: October 25, 2024  Content Version: 14.4  © 6702-6142 [employer].   Care instructions adapted under license by [employer]. If you have questions about a medical condition or this instruction, always ask your healthcare professional. [employer], disclaims any warranty or liability for your use of this information.         Well Visit, Over 65: Care Instructions  Well visits can help you stay healthy. Your doctor has checked your overall health and may have suggested ways to take good care of yourself. Your doctor also may have recommended tests. You can help prevent illness with healthy eating, good sleep, vaccinations, regular exercise, and other steps.    Get the tests that you and your doctor decide on. Depending on your age and risks, examples might include hearing tests as well as screening for colon, breast, and lung cancer. Screening helps find diseases before any symptoms appear.   Eat healthy foods. Choose fruits, vegetables, whole grains, lean protein, and low-fat dairy foods. Limit saturated fat, and reduce salt.     Limit alcohol. Men should have no more than 2 drinks a day. Women should have no more than 1. For some people, no alcohol is the best choice.   Exercise. It can help prevent falls. Get at least 30 minutes of exercise on most days of the week. Walking, yoga, and lolis chi can be good choices.     Reach and stay at your healthy weight. This will lower your risk for many health problems.   Take care of your mental health. Try to stay

## 2025-03-30 PROBLEM — Z87.891 PERSONAL HISTORY OF TOBACCO USE: Status: ACTIVE | Noted: 2024-02-27

## 2025-03-30 PROBLEM — Z00.00 WELL ADULT EXAM: Status: ACTIVE | Noted: 2025-03-30

## 2025-04-29 PROBLEM — Z00.00 WELL ADULT EXAM: Status: RESOLVED | Noted: 2025-03-30 | Resolved: 2025-04-29

## 2025-05-02 DIAGNOSIS — Z79.899 ENCOUNTER FOR LONG-TERM (CURRENT) USE OF MEDICATIONS: ICD-10-CM

## 2025-05-02 DIAGNOSIS — Z12.5 SCREENING FOR PROSTATE CANCER: ICD-10-CM

## 2025-05-02 DIAGNOSIS — E78.2 MIXED HYPERLIPIDEMIA: ICD-10-CM

## 2025-05-02 DIAGNOSIS — E87.1 HYPONATREMIA: ICD-10-CM

## 2025-05-02 DIAGNOSIS — R53.83 FATIGUE, UNSPECIFIED TYPE: ICD-10-CM

## 2025-05-02 DIAGNOSIS — I10 ESSENTIAL HYPERTENSION, BENIGN: ICD-10-CM

## 2025-05-02 LAB
ALBUMIN: 4.2 G/DL (ref 3.5–5.2)
ALP BLD-CCNC: 84 U/L (ref 40–129)
ALT SERPL-CCNC: 51 U/L (ref 0–50)
ANION GAP SERPL CALCULATED.3IONS-SCNC: 14 MMOL/L (ref 7–16)
AST SERPL-CCNC: 46 U/L (ref 0–50)
BASOPHILS ABSOLUTE: 0 K/UL (ref 0–0.2)
BASOPHILS RELATIVE PERCENT: 0 % (ref 0–2)
BILIRUB SERPL-MCNC: 0.8 MG/DL (ref 0–1.2)
BUN BLDV-MCNC: 8 MG/DL (ref 8–23)
CALCIUM SERPL-MCNC: 9.3 MG/DL (ref 8.8–10.2)
CHLORIDE BLD-SCNC: 91 MMOL/L (ref 98–107)
CHOLESTEROL, TOTAL: 164 MG/DL
CO2: 22 MMOL/L (ref 22–29)
CREAT SERPL-MCNC: 0.7 MG/DL (ref 0.7–1.2)
EOSINOPHILS ABSOLUTE: 1.47 K/UL (ref 0.05–0.5)
EOSINOPHILS RELATIVE PERCENT: 19 % (ref 0–6)
GFR, ESTIMATED: >90 ML/MIN/1.73M2
GLUCOSE BLD-MCNC: 119 MG/DL (ref 74–99)
HCT VFR BLD CALC: 43.5 % (ref 37–54)
HDLC SERPL-MCNC: 46 MG/DL
HEMOGLOBIN: 16 G/DL (ref 12.5–16.5)
LDL CHOLESTEROL: 100 MG/DL
LYMPHOCYTES ABSOLUTE: 2 K/UL (ref 1.5–4)
LYMPHOCYTES RELATIVE PERCENT: 26 % (ref 20–42)
MCH RBC QN AUTO: 34 PG (ref 26–35)
MCHC RBC AUTO-ENTMCNC: 36.8 G/DL (ref 32–34.5)
MCV RBC AUTO: 92.6 FL (ref 80–99.9)
MONOCYTES ABSOLUTE: 0.87 K/UL (ref 0.1–0.95)
MONOCYTES RELATIVE PERCENT: 11 % (ref 2–12)
NEUTROPHILS ABSOLUTE: 3.27 K/UL (ref 1.8–7.3)
NEUTROPHILS RELATIVE PERCENT: 43 % (ref 43–80)
PDW BLD-RTO: 13.8 % (ref 11.5–15)
PLATELET, FLUORESCENCE: 324 K/UL (ref 130–450)
PMV BLD AUTO: 8.2 FL (ref 7–12)
POTASSIUM SERPL-SCNC: 4.4 MMOL/L (ref 3.5–5.1)
PROSTATE SPECIFIC ANTIGEN: 0.16 NG/ML (ref 0–4)
RBC # BLD: 4.7 M/UL (ref 3.8–5.8)
RBC # BLD: ABNORMAL 10*6/UL
RBC # BLD: ABNORMAL 10*6/UL
SODIUM BLD-SCNC: 127 MMOL/L (ref 136–145)
TOTAL PROTEIN: 7.2 G/DL (ref 6.4–8.3)
TRIGL SERPL-MCNC: 86 MG/DL
TSH SERPL DL<=0.05 MIU/L-ACNC: 1.87 UIU/ML (ref 0.27–4.2)
VLDLC SERPL CALC-MCNC: 17 MG/DL
WBC # BLD: 7.6 K/UL (ref 4.5–11.5)

## 2025-06-03 ENCOUNTER — RESULTS FOLLOW-UP (OUTPATIENT)
Age: 65
End: 2025-06-03